# Patient Record
Sex: MALE | Race: WHITE | NOT HISPANIC OR LATINO | Employment: UNEMPLOYED | URBAN - METROPOLITAN AREA
[De-identification: names, ages, dates, MRNs, and addresses within clinical notes are randomized per-mention and may not be internally consistent; named-entity substitution may affect disease eponyms.]

---

## 2017-03-02 ENCOUNTER — OFFICE VISIT (OUTPATIENT)
Dept: FAMILY MEDICINE CLINIC | Facility: CLINIC | Age: 32
End: 2017-03-02

## 2017-03-02 VITALS
OXYGEN SATURATION: 98 % | BODY MASS INDEX: 28.58 KG/M2 | HEART RATE: 92 BPM | HEIGHT: 69 IN | SYSTOLIC BLOOD PRESSURE: 122 MMHG | TEMPERATURE: 98 F | WEIGHT: 193 LBS | DIASTOLIC BLOOD PRESSURE: 62 MMHG

## 2017-03-02 DIAGNOSIS — Z71.6 ENCOUNTER FOR SMOKING CESSATION COUNSELING: Primary | ICD-10-CM

## 2017-03-02 PROCEDURE — 99212 OFFICE O/P EST SF 10 MIN: CPT | Performed by: NURSE PRACTITIONER

## 2017-03-02 PROCEDURE — 99406 BEHAV CHNG SMOKING 3-10 MIN: CPT | Performed by: NURSE PRACTITIONER

## 2017-03-02 RX ORDER — VARENICLINE TARTRATE 0.5 MG/1
TABLET, FILM COATED ORAL
Qty: 60 TABLET | Refills: 0 | Status: SHIPPED | OUTPATIENT
Start: 2017-03-02 | End: 2019-03-19

## 2017-03-02 NOTE — PROGRESS NOTES
"Rosie Freeman is a 31 y.o. male. Patient would like to quit smoking     History of Present Illness   He and wife plan to quit smoking together. Has tried cold turkey, chewing on bubble gum and sunflower seeds. Distraction. Was at 1ppd, now 1/2 ppd, just unable to remain full remission from cigarettes.   The following portions of the patient's history were reviewed and updated as appropriate: allergies, current medications, past family history, past medical history, past social history, past surgical history and problem list.    Review of Systems   Constitutional: Positive for activity change and fatigue. Negative for fever and unexpected weight change.   HENT: Negative.    Respiratory: Negative.    Cardiovascular: Negative.    Gastrointestinal: Negative.  Negative for abdominal pain.   Endocrine: Negative for cold intolerance, heat intolerance, polydipsia, polyphagia and polyuria.   Skin: Positive for wound.   Neurological: Negative for seizures, light-headedness and headaches.   Psychiatric/Behavioral: Positive for dysphoric mood. Negative for agitation, confusion, decreased concentration, hallucinations, self-injury, sleep disturbance and suicidal ideas. The patient is not nervous/anxious and is not hyperactive.      Visit Vitals   • /62   • Pulse 92   • Temp 98 °F (36.7 °C) (Oral)   • Ht 69\" (175.3 cm)   • Wt 193 lb (87.5 kg)   • SpO2 98%   • BMI 28.5 kg/m2     Objective   Physical Exam   Constitutional: He appears well-developed and well-nourished.   Cardiovascular: Normal rate.    Pulmonary/Chest: Effort normal.   Skin: Skin is warm and dry.   Ingrown hair symphysis pubis 4mm across, no redness or heat.    Psychiatric: His behavior is normal. Judgment and thought content normal.   Nursing note and vitals reviewed.    Assessment/Plan   Problems Addressed this Visit     None      Visit Diagnoses     Encounter for smoking cessation counseling    -  Primary        Smoking cessation 5 /7 " minutes face to face counseling, would like to consider chantix for smoking cessation. Continue distractions. Cautions with other SSRIs discussed. Will call if side effects.

## 2017-03-27 ENCOUNTER — TELEPHONE (OUTPATIENT)
Dept: FAMILY MEDICINE CLINIC | Facility: CLINIC | Age: 32
End: 2017-03-27

## 2017-03-27 RX ORDER — VARENICLINE TARTRATE 1 MG/1
1 TABLET, FILM COATED ORAL 2 TIMES DAILY
Qty: 60 TABLET | Refills: 0 | Status: SHIPPED | OUTPATIENT
Start: 2017-03-27 | End: 2019-03-19

## 2017-11-27 ENCOUNTER — OFFICE VISIT (OUTPATIENT)
Dept: FAMILY MEDICINE CLINIC | Facility: CLINIC | Age: 32
End: 2017-11-27

## 2017-11-27 VITALS
WEIGHT: 191 LBS | DIASTOLIC BLOOD PRESSURE: 64 MMHG | OXYGEN SATURATION: 98 % | HEIGHT: 69 IN | BODY MASS INDEX: 28.29 KG/M2 | SYSTOLIC BLOOD PRESSURE: 126 MMHG | TEMPERATURE: 98.4 F | HEART RATE: 96 BPM

## 2017-11-27 DIAGNOSIS — H66.001 ACUTE SUPPURATIVE OTITIS MEDIA OF RIGHT EAR WITHOUT SPONTANEOUS RUPTURE OF TYMPANIC MEMBRANE, RECURRENCE NOT SPECIFIED: Primary | ICD-10-CM

## 2017-11-27 DIAGNOSIS — Z23 NEED FOR INFLUENZA VACCINATION: ICD-10-CM

## 2017-11-27 PROCEDURE — 90471 IMMUNIZATION ADMIN: CPT | Performed by: NURSE PRACTITIONER

## 2017-11-27 PROCEDURE — 99213 OFFICE O/P EST LOW 20 MIN: CPT | Performed by: NURSE PRACTITIONER

## 2017-11-27 PROCEDURE — 90686 IIV4 VACC NO PRSV 0.5 ML IM: CPT | Performed by: NURSE PRACTITIONER

## 2017-11-27 RX ORDER — AMOXICILLIN 875 MG/1
875 TABLET, COATED ORAL 2 TIMES DAILY
Qty: 20 TABLET | Refills: 0 | Status: SHIPPED | OUTPATIENT
Start: 2017-11-27 | End: 2019-03-19

## 2017-11-27 NOTE — PROGRESS NOTES
"Subjective   Gelacio Freeman is a 32 y.o. male. R earache and fullness. Headaches. Started yesterday afternoon with pain and pressure in the right ear and some in the left ear but right is much worse. He did use some numbing gtts they had at home but they did not help any.  Put icy hot on right side of face last night to help with headache and ear ache. Also took Tylenol. Denies fever, chills or ear drainage. Unable to sleep due to pain.     Earache    There is pain in the right ear. This is a new problem. The current episode started yesterday. The problem occurs hourly. The problem has been unchanged. There has been no fever. The pain is at a severity of 3/10. The pain is mild. Associated symptoms include hearing loss (decreased hearing- sounds \"muffled\"). Pertinent negatives include no ear discharge or rhinorrhea. He has tried acetaminophen and heat packs for the symptoms. The treatment provided mild relief.        The following portions of the patient's history were reviewed and updated as appropriate: allergies, current medications, past medical history, past social history, past surgical history and problem list.    Review of Systems   Constitutional: Negative.    HENT: Positive for ear pain and hearing loss (decreased hearing- sounds \"muffled\"). Negative for ear discharge, postnasal drip and rhinorrhea.    Respiratory: Negative.    Cardiovascular: Negative.    Gastrointestinal:        Hx of acid reflux         Objective   Physical Exam   Constitutional: Vital signs are normal. He appears well-developed and well-nourished. He is cooperative.   HENT:   Right Ear: There is swelling and tenderness. No drainage. There is mastoid tenderness. Tympanic membrane is injected, erythematous and bulging. A middle ear effusion is present. Decreased hearing is noted.   Left Ear: Tympanic membrane is erythematous. A middle ear effusion is present.   Cardiovascular: Normal rate and regular rhythm.    Pulmonary/Chest: " "Effort normal and breath sounds normal.   Neurological: He is alert.   Skin: Skin is warm and dry.   Psychiatric: He has a normal mood and affect. His behavior is normal. Judgment and thought content normal.   Nursing note and vitals reviewed.     Vitals:    11/27/17 0859   BP: 126/64   Pulse: 96   Temp: 98.4 °F (36.9 °C)   TempSrc: Oral   SpO2: 98%   Weight: 191 lb (86.6 kg)   Height: 69\" (175.3 cm)       Assessment/Plan   Diagnoses and all orders for this visit:    Acute suppurative otitis media of right ear without spontaneous rupture of tympanic membrane, recurrence not specified  -     amoxicillin (AMOXIL) 875 MG tablet; Take 1 tablet by mouth 2 (Two) Times a Day.    Need for influenza vaccination  -     Flu Vaccine Quad PF 3YR+    RTC if not improved.             "

## 2019-03-19 ENCOUNTER — OFFICE VISIT (OUTPATIENT)
Dept: FAMILY MEDICINE CLINIC | Facility: CLINIC | Age: 34
End: 2019-03-19

## 2019-03-19 VITALS
WEIGHT: 184 LBS | DIASTOLIC BLOOD PRESSURE: 74 MMHG | HEIGHT: 69 IN | SYSTOLIC BLOOD PRESSURE: 124 MMHG | TEMPERATURE: 98.4 F | OXYGEN SATURATION: 97 % | HEART RATE: 71 BPM | BODY MASS INDEX: 27.25 KG/M2

## 2019-03-19 DIAGNOSIS — R09.1 PLEURISY: Primary | ICD-10-CM

## 2019-03-19 PROCEDURE — 99213 OFFICE O/P EST LOW 20 MIN: CPT | Performed by: NURSE PRACTITIONER

## 2019-03-19 PROCEDURE — 71046 X-RAY EXAM CHEST 2 VIEWS: CPT | Performed by: NURSE PRACTITIONER

## 2019-03-19 RX ORDER — METHYLPREDNISOLONE 4 MG/1
TABLET ORAL
Qty: 21 TABLET | Refills: 0 | Status: SHIPPED | OUTPATIENT
Start: 2019-03-19 | End: 2021-01-10

## 2019-03-19 NOTE — PROGRESS NOTES
"Subjective   Gelacio Freeman is a 33 y.o. male who presents c/o pain on right side of chest. Feels like pleurisy.     History of Present Illness   Onset of R sided chest pain last night, worsened this morning though has had a similar episode in the past. Has not had associated cold. 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 Bit by tick last weekend. On R shoulder blade, no rash. Got tick off immediately (not engorged).   Last time had pleurisy went to  ER.   Fell Sat playing basketball, landed on L hip and L arm  The following portions of the patient's history were reviewed and updated as appropriate: allergies, current medications, past family history, past medical history, past social history, past surgical history and problem list.    Review of Systems   Constitutional: Negative for chills and fever.   HENT: Negative.    Respiratory: Positive for chest tightness (R sided) and shortness of breath (did use inhaler from last episode of pleurisy). Negative for cough and wheezing.    Cardiovascular: Negative.    Gastrointestinal: Negative.    Musculoskeletal: Positive for arthralgias (R shoulder blade). Negative for myalgias.   Skin: Negative for rash.     /74   Pulse 71   Temp 98.4 °F (36.9 °C) (Oral)   Ht 175.3 cm (69\")   Wt 83.5 kg (184 lb)   SpO2 97%   BMI 27.17 kg/m²     Objective   Physical Exam   Constitutional: He appears well-developed and well-nourished. No distress.   HENT:   Right Ear: Tympanic membrane normal.   Left Ear: Tympanic membrane normal.   Nose: Nose normal. Right sinus exhibits no maxillary sinus tenderness and no frontal sinus tenderness. Left sinus exhibits no maxillary sinus tenderness and no frontal sinus tenderness.   Mouth/Throat: Uvula is midline and mucous membranes are normal. Posterior oropharyngeal erythema present.   Neck: Normal range of motion. Neck supple. No JVD present. No tracheal deviation present. No thyromegaly present. "   Cardiovascular: Normal rate, regular rhythm and normal heart sounds.   Pulmonary/Chest: Effort normal. He has wheezes (trivial R).   Musculoskeletal:        Right shoulder: He exhibits decreased range of motion, bony tenderness (R scapula, clavicle and ribs nontender) and spasm (R rhomboids). He exhibits no crepitus and no pain.        Cervical back: Normal.        Thoracic back: He exhibits spasm (R rhomboids). He exhibits no bony tenderness.   Lymphadenopathy:     He has no cervical adenopathy.   Nursing note and vitals reviewed.    Assessment/Plan   Problems Addressed this Visit     None      Visit Diagnoses     Pleurisy    -  Primary    Relevant Medications    methylPREDNISolone (MEDROL) 4 MG tablet    Other Relevant Orders    XR Chest PA & Lateral (In Office)        XRAY with NAD, no comparison, will send to radiology for interpretation--recommend medrol dose pack, follow up if symptoms not settling.

## 2019-10-22 ENCOUNTER — FLU SHOT (OUTPATIENT)
Dept: FAMILY MEDICINE CLINIC | Facility: CLINIC | Age: 34
End: 2019-10-22

## 2019-10-22 DIAGNOSIS — Z23 FLU VACCINE NEED: ICD-10-CM

## 2019-10-22 PROCEDURE — 90471 IMMUNIZATION ADMIN: CPT | Performed by: FAMILY MEDICINE

## 2019-10-22 PROCEDURE — 90674 CCIIV4 VAC NO PRSV 0.5 ML IM: CPT | Performed by: FAMILY MEDICINE

## 2020-02-10 ENCOUNTER — TELEPHONE (OUTPATIENT)
Dept: FAMILY MEDICINE CLINIC | Facility: CLINIC | Age: 35
End: 2020-02-10

## 2020-02-10 RX ORDER — OSELTAMIVIR PHOSPHATE 75 MG/1
75 CAPSULE ORAL 2 TIMES DAILY
Qty: 10 CAPSULE | Refills: 0 | Status: SHIPPED | OUTPATIENT
Start: 2020-02-10 | End: 2021-01-10

## 2020-02-10 NOTE — TELEPHONE ENCOUNTER
Pt has flu like symptoms and his daughter has the flu. Pt is asking that you send in tamiflu to [facility] on [address].

## 2020-03-31 ENCOUNTER — TELEPHONE (OUTPATIENT)
Dept: FAMILY MEDICINE CLINIC | Facility: CLINIC | Age: 35
End: 2020-03-31

## 2020-03-31 NOTE — TELEPHONE ENCOUNTER
Patients wife called saying that his mother was exposed to a positive covid 19 patient while delivering groceries. His mother lives with her and has been asked to self quarantine. She has no symptoms and the exposure was 13 days ago. Positive test came back today. Patients employer advised him to get tested for covid 19 and spouse called to inquire. He does not have any symptoms, is not a health care worker and was not directly exposed. Spouse informed he would not be tested under the current guidelines because of this but should self quarantine also. YUDY.

## 2021-01-07 ENCOUNTER — OFFICE VISIT (OUTPATIENT)
Dept: FAMILY MEDICINE CLINIC | Facility: CLINIC | Age: 36
End: 2021-01-07

## 2021-01-07 VITALS
WEIGHT: 182 LBS | BODY MASS INDEX: 26.05 KG/M2 | HEIGHT: 70 IN | OXYGEN SATURATION: 98 % | SYSTOLIC BLOOD PRESSURE: 122 MMHG | DIASTOLIC BLOOD PRESSURE: 70 MMHG | TEMPERATURE: 98.2 F | HEART RATE: 68 BPM

## 2021-01-07 DIAGNOSIS — L30.9 PERIORBITAL DERMATITIS: Primary | ICD-10-CM

## 2021-01-07 PROCEDURE — 99213 OFFICE O/P EST LOW 20 MIN: CPT | Performed by: NURSE PRACTITIONER

## 2021-01-07 RX ORDER — MELOXICAM 15 MG/1
15 TABLET ORAL DAILY PRN
COMMUNITY
Start: 2020-12-07 | End: 2022-03-08

## 2021-01-07 RX ORDER — TRIAMCINOLONE ACETONIDE 1 MG/G
CREAM TOPICAL 2 TIMES DAILY
Qty: 15 G | Refills: 0 | Status: SHIPPED | OUTPATIENT
Start: 2021-01-07 | End: 2022-03-08

## 2021-01-07 NOTE — PROGRESS NOTES
"Subjective   Gelacio Freeman is a 35 y.o. male who presents c/o swelling under right eye off and on x  1 month.    History of Present Illness   Itching an mild swelling. No pain. No discharge. No nodularity. No visual changes. Has been drier and more swollen at times.  The following portions of the patient's history were reviewed and updated as appropriate: allergies, current medications, past family history, past medical history, past social history, past surgical history and problem list.    Review of Systems   Constitutional: Positive for activity change and fatigue. Negative for fever and unexpected weight change.   HENT: Negative.    Eyes: Positive for redness and itching. Negative for photophobia, pain, discharge and visual disturbance.   Respiratory: Negative.    Cardiovascular: Negative.    Gastrointestinal: Negative.  Negative for abdominal pain.   Endocrine: Negative for cold intolerance, heat intolerance, polydipsia, polyphagia and polyuria.   Skin: Positive for color change and rash.   Neurological: Negative for seizures, light-headedness and headaches.   Psychiatric/Behavioral: Positive for decreased concentration, dysphoric mood and sleep disturbance. Negative for agitation, confusion, hallucinations, self-injury and suicidal ideas. The patient is nervous/anxious. The patient is not hyperactive.    /70   Pulse 68   Temp 98.2 °F (36.8 °C) (Infrared)   Ht 177.8 cm (70\")   Wt 82.6 kg (182 lb)   SpO2 98%   BMI 26.11 kg/m²       Objective   Physical Exam  Constitutional:       General: He is not in acute distress.     Appearance: He is well-developed. He is not diaphoretic.   Eyes:      Comments: R lower lid swollen scaly and red. Mild cracking. No bleeding or nodularity   Pulmonary:      Effort: Pulmonary effort is normal.   Skin:     General: Skin is dry.   Neurological:      Mental Status: He is alert and oriented to person, place, and time.   Psychiatric:         Behavior: Behavior " normal.         Thought Content: Thought content normal.         Judgment: Judgment normal.       Assessment/Plan   Problems Addressed this Visit     None      Visit Diagnoses     Periorbital dermatitis    -  Primary    Relevant Medications    triamcinolone (KENALOG) 0.1 % cream      Diagnoses       Codes Comments    Periorbital dermatitis    -  Primary ICD-10-CM: L30.9  ICD-9-CM: 692.9         Cautions with steroid use around the eye given. Should not treat for more than 2 weeks, do not get steroid into eye. FU if not settling

## 2022-03-08 ENCOUNTER — OFFICE VISIT (OUTPATIENT)
Dept: FAMILY MEDICINE CLINIC | Facility: CLINIC | Age: 37
End: 2022-03-08

## 2022-03-08 VITALS
HEART RATE: 69 BPM | WEIGHT: 184 LBS | HEIGHT: 70 IN | OXYGEN SATURATION: 98 % | BODY MASS INDEX: 26.34 KG/M2 | SYSTOLIC BLOOD PRESSURE: 116 MMHG | DIASTOLIC BLOOD PRESSURE: 62 MMHG

## 2022-03-08 DIAGNOSIS — L50.9 URTICARIA: Primary | ICD-10-CM

## 2022-03-08 PROCEDURE — 99213 OFFICE O/P EST LOW 20 MIN: CPT

## 2022-03-08 RX ORDER — METHYLPREDNISOLONE 4 MG/1
1 TABLET ORAL DAILY
Qty: 21 EACH | Refills: 0 | Status: SHIPPED | OUTPATIENT
Start: 2022-03-08 | End: 2022-03-08

## 2022-03-08 RX ORDER — IBUPROFEN 800 MG/1
800 TABLET ORAL EVERY 6 HOURS PRN
COMMUNITY
Start: 2022-01-28

## 2022-03-08 RX ORDER — PREDNISONE 20 MG/1
TABLET ORAL
Qty: 13 TABLET | Refills: 0 | Status: SHIPPED | OUTPATIENT
Start: 2022-03-08 | End: 2022-03-16

## 2022-03-08 NOTE — PROGRESS NOTES
"Rosie Freeman is a 36 y.o. male. Who presents with complaints of a rash on neck       History of Present Illness     Rash on neck    Was lying on couch and Felt like got bit on Thursday night and Friday woke up with a rash on L side of neck that spread to R side of neck and now down to chest .   Had allergy test done prior due to same issue- states found nothing  Has gotten similar rash before head to toe, last time 5 months ago.   Has not found any triggers- thinks happens mostly in cold weather.   Only symptom itchy  Has been using Benadryl spray and cream and taking PO benadryl with some relief.     The following portions of the patient's history were reviewed and updated as appropriate: allergies, current medications, past family history, past medical history, past social history and past surgical history.    Review of Systems   Constitutional: Negative for fatigue, fever and unexpected weight loss.   Musculoskeletal: Negative for arthralgias.   Skin: Positive for rash. Negative for skin lesions.   Psychiatric/Behavioral: Negative for sleep disturbance.       Objective    /62   Pulse 69   Ht 177.8 cm (70\")   Wt 83.5 kg (184 lb)   SpO2 98%   BMI 26.40 kg/m²     Physical Exam  Constitutional:       Appearance: Normal appearance.   Pulmonary:      Effort: Pulmonary effort is normal. No respiratory distress.   Skin:     Findings: Rash present. Rash is urticarial.          Neurological:      General: No focal deficit present.      Mental Status: He is alert and oriented to person, place, and time.   Psychiatric:         Mood and Affect: Mood normal.         Behavior: Behavior normal.         Thought Content: Thought content normal.         Judgment: Judgment normal.         Assessment/Plan   Diagnoses and all orders for this visit:    1. Urticaria (Primary)  -     predniSONE (DELTASONE) 20 MG tablet; Take 3 tablets by mouth Daily for 2 days, THEN 2 tablets Daily for 2 days, THEN 1 " tablet Daily for 2 days, THEN 0.5 tablets Daily for 2 days.  Dispense: 13 tablet; Refill: 0  -    Discussed rash and description of symptom sounds more urticarial. Discussed Dx, symptoms and Tx options. First line tx to prevent reoccurrence is daily antihistamines such as fexofenadine, citrizine, loratadine, etc. Advised against using benadryl as sedating. May use cold compress and topical benadryl for symptoms. Pay attention for triggers.   -   Will give a steroid taper as rash is spread and topical tx may not be feasible. Use and SE discussed.     RTC if no improvement or worsening of symptoms.      - Pt agrees with plan of care and states no further concerns or questions today    This document is intended for medical expert use only. Reading of this document by patients and/or patient's family without participating medical staff guidance may result in misinterpretation and unintended morbidity.  Any interpretation of such data is the responsibility of the patient and/or family member responsible for the patient in concert with their primary or specialist providers, not to be left for sources of online searches such as [employer], [employer] or similar queries. Relying on these approaches to knowledge may result in misinterpretation, misguided goals of care and even death should patients or family members try recommendations outside of the realm of professional medical care in a supervised way.     Please allow 3-5 business days for recommendations based on new results     Go to the ER for any possible lifethreatening symptoms such as chest pain or shortness of air.      I personally spent 20 minutes with this patient, preparing for the visit, reviewing tests, obtaining and/or reviewing a separately obtained history, performing a medically appropriate examination and/or evaluation , counseling and educating the patient/family/caregiver, ordering medications, tests, or procedures, documenting information in the medical  record and independently interpreting results.

## 2024-07-29 ENCOUNTER — OFFICE VISIT (OUTPATIENT)
Dept: FAMILY MEDICINE CLINIC | Facility: CLINIC | Age: 39
End: 2024-07-29
Payer: COMMERCIAL

## 2024-07-29 VITALS
HEIGHT: 70 IN | OXYGEN SATURATION: 98 % | HEART RATE: 66 BPM | BODY MASS INDEX: 27.37 KG/M2 | SYSTOLIC BLOOD PRESSURE: 108 MMHG | DIASTOLIC BLOOD PRESSURE: 64 MMHG | WEIGHT: 191.2 LBS

## 2024-07-29 DIAGNOSIS — F51.01 PRIMARY INSOMNIA: ICD-10-CM

## 2024-07-29 DIAGNOSIS — F32.2 CURRENT SEVERE EPISODE OF MAJOR DEPRESSIVE DISORDER WITHOUT PSYCHOTIC FEATURES, UNSPECIFIED WHETHER RECURRENT: Primary | ICD-10-CM

## 2024-07-29 DIAGNOSIS — Z83.3 FAMILY HISTORY OF DIABETES MELLITUS: ICD-10-CM

## 2024-07-29 DIAGNOSIS — Z11.59 ENCOUNTER FOR HEPATITIS C SCREENING TEST FOR LOW RISK PATIENT: ICD-10-CM

## 2024-07-29 DIAGNOSIS — E78.2 MIXED HYPERLIPIDEMIA: ICD-10-CM

## 2024-07-29 PROCEDURE — 99396 PREV VISIT EST AGE 40-64: CPT | Performed by: NURSE PRACTITIONER

## 2024-07-29 RX ORDER — BUPROPION HYDROCHLORIDE 150 MG/1
150 TABLET ORAL DAILY
Qty: 90 TABLET | Refills: 3 | Status: SHIPPED | OUTPATIENT
Start: 2024-07-29

## 2024-07-29 RX ORDER — TRAZODONE HYDROCHLORIDE 100 MG/1
100 TABLET ORAL NIGHTLY
Qty: 90 TABLET | Refills: 3 | Status: SHIPPED | OUTPATIENT
Start: 2024-07-29

## 2024-07-29 NOTE — PROGRESS NOTES
Subjective   Gelacio Freeman is a 39 y.o. male. Presents today for   Chief Complaint   Patient presents with    Insomnia    Depression         HPI Annual Physical, establish care, Depression and Insomnia    Has tried  ambien in the past for insomnia and awoke in his boxers on the porch in the dead of winter.  Uses marijuana to sleep.  He is depressed so much so that he does not even want to go fishing or hunting - and he has always been an outdoorsman.  This has been ongoing for a couple of years.  He would like medication to help him.            Past Medical History:   Diagnosis Date    Achilles bursitis     Anxiety     Asthma     Chronic pain syndrome     Colitis     Depression     Disc degeneration, lumbosacral     Familial hypertriglyceridemia     Fatigue     Gastroparesis     GERD (gastroesophageal reflux disease)     Leukocytosis     Low back pain     Lumbar postlaminectomy syndrome     Lumbar radiculopathy     Medication addiction, episodic     Opiate withdrawal     Renal shutdown, acute     Seizure     Snoring     Vitamin D deficiency        Past Surgical History:   Procedure Laterality Date    BACK SURGERY      KNEE ARTHROSCOPY W/ MENISCAL REPAIR Bilateral         No Known Allergies    Current Outpatient Medications on File Prior to Visit   Medication Sig Dispense Refill    ibuprofen (ADVIL,MOTRIN) 800 MG tablet Take 800 mg by mouth Every 6 (Six) Hours As Needed. for pain (Patient not taking: Reported on 7/29/2024)       No current facility-administered medications on file prior to visit.       Social History     Socioeconomic History    Marital status:    Tobacco Use    Smoking status: Every Day     Current packs/day: 0.50     Average packs/day: 0.5 packs/day for 24.6 years (12.3 ttl pk-yrs)     Types: Cigarettes     Start date: 2000    Smokeless tobacco: Never   Vaping Use    Vaping status: Every Day    Substances: Nicotine, THC    Devices: Disposable   Substance and Sexual Activity     "Alcohol use: Not Currently    Drug use: No    Sexual activity: Defer       Occupation/Lives with: [employer]. Lives with wife and children    Sleep: Gets 4-8 hours of sleep/night    Exercise: softball and walking at work    Nutrition: not so good at present    Caffeine: Monster in am; Dr. Pepper, Sweet Tea    Tobb/Vaping/Illicit Drugs/ETOH: marijuana    Sexual hx (#partners, m/f, bc, LMP): one, wife    Mood: severely depressed    Safety: Feels safe at home with the people he/she is around.    Health Maintenance/Labs: will be done today    Last eye exam: March 2024    Last dentist visit: he does not remember - 6 month to a year    Specialists: Neurology - shocked with 220 V, Gastro, Ortho, Pleurisy twice (pulmonary)  ___________________________  Review of Systems   Denies dizziness, fatigue, fever/chills, CP, SOA, headache, blood in urine/stool, abd pain, leg swelling.    Objective   Vitals:    07/29/24 1527   BP: 108/64   Pulse: 66   SpO2: 98%   Weight: 86.7 kg (191 lb 3.2 oz)   Height: 177.8 cm (70\")     Body mass index is 27.43 kg/m².    Physical Exam  Constitutional:       Appearance: He is obese.   HENT:      Head: Normocephalic and atraumatic.      Mouth/Throat:      Mouth: Mucous membranes are moist.   Eyes:      Pupils: Pupils are equal, round, and reactive to light.   Cardiovascular:      Rate and Rhythm: Normal rate and regular rhythm.      Pulses: Normal pulses.      Heart sounds: Normal heart sounds.   Pulmonary:      Effort: Pulmonary effort is normal.      Breath sounds: Normal breath sounds.   Abdominal:      General: Bowel sounds are normal.   Musculoskeletal:         General: Normal range of motion.   Skin:     General: Skin is warm and dry.      Capillary Refill: Capillary refill takes less than 2 seconds.   Neurological:      General: No focal deficit present.      Mental Status: He is alert.      Comments: CN II-XII grossly intact on exam AEB following finger with eyes, puffing cheeks, sticking " out tongue, wiggling tongue, raising eyebrows, and shrugging shoulders.   Patient has equal push pull of upper and lower extremities.  Patient can heel walk, toe walk, heel-toe walk, squat and duck walk without difficulty.  There is no s/s of scoliosis while patient bends over and this provider palpates spine.     Psychiatric:         Mood and Affect: Mood normal.             Procedures     Assessment & Plan   Diagnoses and all orders for this visit:    1. Current severe episode of major depressive disorder without psychotic features, unspecified whether recurrent (Primary)  -     buPROPion XL (Wellbutrin XL) 150 MG 24 hr tablet; Take 1 tablet by mouth Daily.  Dispense: 90 tablet; Refill: 3  -     CBC & Differential  -     Comprehensive Metabolic Panel    2. Primary insomnia  -     traZODone (DESYREL) 100 MG tablet; Take 1 tablet by mouth Every Night.  Dispense: 90 tablet; Refill: 3    3. Mixed hyperlipidemia  -     Lipid Panel    4. Family history of diabetes mellitus  -     Hemoglobin A1c    5. Encounter for hepatitis C screening test for low risk patient  -     HCV Antibody Rfx To Qnt PCR  -     Hepatitis C Virus Interpretation        Body mass index is 27.43 kg/m².       Return in about 6 months (around 1/29/2025) for Next scheduled follow up.     Counseled on a healthy diet. Use condoms 100% of time with sex as IUD does not protect against STIs. Eat a healthy diet and exercise routinely. Avoid smoking/alcohol and drugs. Use seatbelt 100% of time.     Discussed Care Gaps, ordered referrals and encouraged vaccination updates.       - Pt agrees with plan of care and denies further questions/concerns today  - This document is intended for medical expert use only. Persons  reading this document without medical staff guidance may result in misinterpretation and unintended morbidity     Go to the ER for any possible life-threatening symptoms such as chest pain or shortness of air.      Please allow 3-5 business days  for recommendations based on new results      I personally spent time with this patient, preparing for the visit, reviewing tests, obtaining and/or reviewing a separately obtained history, performing a medically appropriate examination and/or evaluation, counseling and educating the patient/family/caregiver, ordering medications,  documenting information in the medical record and indepentently interpreting results.

## 2024-07-30 LAB
ALBUMIN SERPL-MCNC: 4.7 G/DL (ref 4.1–5.1)
ALP SERPL-CCNC: 124 IU/L (ref 44–121)
ALT SERPL-CCNC: 34 IU/L (ref 0–44)
AST SERPL-CCNC: 30 IU/L (ref 0–40)
BASOPHILS # BLD AUTO: 0.1 X10E3/UL (ref 0–0.2)
BASOPHILS NFR BLD AUTO: 1 %
BILIRUB SERPL-MCNC: 0.2 MG/DL (ref 0–1.2)
BUN SERPL-MCNC: 6 MG/DL (ref 6–24)
BUN/CREAT SERPL: 6 (ref 9–20)
CALCIUM SERPL-MCNC: 9.7 MG/DL (ref 8.7–10.2)
CHLORIDE SERPL-SCNC: 101 MMOL/L (ref 96–106)
CHOLEST SERPL-MCNC: 230 MG/DL (ref 100–199)
CO2 SERPL-SCNC: 24 MMOL/L (ref 20–29)
CREAT SERPL-MCNC: 1.02 MG/DL (ref 0.76–1.27)
EGFRCR SERPLBLD CKD-EPI 2021: 95 ML/MIN/1.73
EOSINOPHIL # BLD AUTO: 0.3 X10E3/UL (ref 0–0.4)
EOSINOPHIL NFR BLD AUTO: 3 %
ERYTHROCYTE [DISTWIDTH] IN BLOOD BY AUTOMATED COUNT: 13 % (ref 11.6–15.4)
GLOBULIN SER CALC-MCNC: 2.5 G/DL (ref 1.5–4.5)
GLUCOSE SERPL-MCNC: 89 MG/DL (ref 70–99)
HBA1C MFR BLD: 5.7 % (ref 4.8–5.6)
HCT VFR BLD AUTO: 45.4 % (ref 37.5–51)
HCV AB SERPL QL IA: NORMAL
HCV IGG SERPL QL IA: NON REACTIVE
HDLC SERPL-MCNC: 26 MG/DL
HGB BLD-MCNC: 15.2 G/DL (ref 13–17.7)
IMM GRANULOCYTES # BLD AUTO: 0 X10E3/UL (ref 0–0.1)
IMM GRANULOCYTES NFR BLD AUTO: 0 %
LDLC SERPL CALC-MCNC: 102 MG/DL (ref 0–99)
LYMPHOCYTES # BLD AUTO: 3.6 X10E3/UL (ref 0.7–3.1)
LYMPHOCYTES NFR BLD AUTO: 34 %
MCH RBC QN AUTO: 30 PG (ref 26.6–33)
MCHC RBC AUTO-ENTMCNC: 33.5 G/DL (ref 31.5–35.7)
MCV RBC AUTO: 90 FL (ref 79–97)
MONOCYTES # BLD AUTO: 0.7 X10E3/UL (ref 0.1–0.9)
MONOCYTES NFR BLD AUTO: 7 %
NEUTROPHILS # BLD AUTO: 5.7 X10E3/UL (ref 1.4–7)
NEUTROPHILS NFR BLD AUTO: 55 %
PLATELET # BLD AUTO: 379 X10E3/UL (ref 150–450)
POTASSIUM SERPL-SCNC: 3.9 MMOL/L (ref 3.5–5.2)
PROT SERPL-MCNC: 7.2 G/DL (ref 6–8.5)
RBC # BLD AUTO: 5.07 X10E6/UL (ref 4.14–5.8)
SODIUM SERPL-SCNC: 140 MMOL/L (ref 134–144)
TRIGL SERPL-MCNC: 598 MG/DL (ref 0–149)
VLDLC SERPL CALC-MCNC: 102 MG/DL (ref 5–40)
WBC # BLD AUTO: 10.3 X10E3/UL (ref 3.4–10.8)

## 2024-08-29 NOTE — PROGRESS NOTES
"Subjective   Gelacio Freeman is a 39 y.o. male. Presents today for   Chief Complaint   Patient presents with    Depression     Follow Up     Not taking Bupropion due to sleepiness at work       History Of Present Illness Gelacio presents today for 1 month follow-up on depression and insomnia.  He reports that the trazodone is giving him vivid dreams and the Bupropion is making him too sleepy during the day to take them.    Depression: Bupropion     Insomnia: Trazodone    Care gaps:  BMI  Pneumococcal vaccine  Tdap  COVID-not available  Influenza-not available      There is no problem list on file for this patient.      Social History     Socioeconomic History    Marital status:    Tobacco Use    Smoking status: Every Day     Current packs/day: 0.50     Average packs/day: 0.5 packs/day for 24.7 years (12.3 ttl pk-yrs)     Types: Cigarettes     Start date: 2000    Smokeless tobacco: Never   Vaping Use    Vaping status: Every Day    Substances: Nicotine, THC    Devices: Disposable   Substance and Sexual Activity    Alcohol use: Not Currently    Drug use: No    Sexual activity: Defer       No Known Allergies    Current Outpatient Medications on File Prior to Visit   Medication Sig Dispense Refill    [DISCONTINUED] traZODone (DESYREL) 100 MG tablet Take 1 tablet by mouth Every Night. 90 tablet 3    ibuprofen (ADVIL,MOTRIN) 800 MG tablet Take 800 mg by mouth Every 6 (Six) Hours As Needed. for pain (Patient not taking: Reported on 7/29/2024)      [DISCONTINUED] buPROPion XL (Wellbutrin XL) 150 MG 24 hr tablet Take 1 tablet by mouth Daily. (Patient not taking: Reported on 8/30/2024) 90 tablet 3     No current facility-administered medications on file prior to visit.       Objective   Vitals:    08/30/24 1443   BP: 108/64   Pulse: 77   SpO2: 98%   Weight: 79.7 kg (175 lb 12.8 oz)   Height: 177.8 cm (70\")     Body mass index is 25.22 kg/m².    Physical Exam  HENT:      Head: Normocephalic and atraumatic.      " Nose: Nose normal.      Mouth/Throat:      Mouth: Mucous membranes are moist.   Eyes:      Pupils: Pupils are equal, round, and reactive to light.      Comments: Right eye has a broken blood vessel located laterally in the eye.  He does not remember doing anything to it.   Cardiovascular:      Rate and Rhythm: Normal rate and regular rhythm.   Pulmonary:      Effort: Pulmonary effort is normal.      Breath sounds: Normal breath sounds.   Abdominal:      General: Abdomen is flat. Bowel sounds are normal.      Palpations: Abdomen is soft.   Musculoskeletal:         General: Normal range of motion.      Cervical back: Normal range of motion.   Skin:     General: Skin is warm.      Capillary Refill: Capillary refill takes less than 2 seconds.   Neurological:      General: No focal deficit present.      Mental Status: He is alert.   Psychiatric:         Mood and Affect: Mood normal.     Procedures     Assessment & Plan   Diagnoses and all orders for this visit:    1. Primary insomnia (Primary)  -     sertraline (Zoloft) 25 MG tablet; Take 1 tablet by mouth Daily.  Dispense: 30 tablet; Refill: 3    2. Other depression  -     Desvenlafaxine Succinate ER 25 MG tablet sustained-release 24 hour; Take 1 tablet by mouth Daily.  Dispense: 30 tablet; Refill: 3    3. Hypercholesterolemia  -     Lipid Panel    4. Thyroid disorder screening  -     Thyroid Panel With TSH           BMI is >= 25 and <30. (Overweight) The following options were offered after discussion;: weight loss educational material (shared in after visit summary), exercise counseling/recommendations, nutrition counseling/recommendations, and pharmacological intervention options     Discussed Care Gaps, ordered referrals and encouraged vaccination updates.       - Pt agrees with plan of care and denies further questions/concerns today  - This document is intended for medical expert use only. Persons  reading this document without medical staff guidance may result in  misinterpretation and unintended morbidity     Go to the ER for any possible life-threatening symptoms such as chest pain or shortness of air.      Please allow 3-5 business days for recommendations based on new results      I personally spent time with this patient, preparing for the visit, reviewing tests, obtaining and/or reviewing a separately obtained history, performing a medically appropriate examination and/or evaluation, counseling and educating the patient/family/caregiver, ordering medications,  documenting information in the medical record and indepentently interpreting results.         No follow-ups on file.

## 2024-08-30 ENCOUNTER — OFFICE VISIT (OUTPATIENT)
Dept: FAMILY MEDICINE CLINIC | Facility: CLINIC | Age: 39
End: 2024-08-30
Payer: COMMERCIAL

## 2024-08-30 VITALS
WEIGHT: 175.8 LBS | BODY MASS INDEX: 25.17 KG/M2 | DIASTOLIC BLOOD PRESSURE: 64 MMHG | SYSTOLIC BLOOD PRESSURE: 108 MMHG | HEIGHT: 70 IN | OXYGEN SATURATION: 98 % | HEART RATE: 77 BPM

## 2024-08-30 DIAGNOSIS — E78.00 HYPERCHOLESTEROLEMIA: ICD-10-CM

## 2024-08-30 DIAGNOSIS — F32.89 OTHER DEPRESSION: ICD-10-CM

## 2024-08-30 DIAGNOSIS — Z13.29 THYROID DISORDER SCREENING: ICD-10-CM

## 2024-08-30 DIAGNOSIS — F51.01 PRIMARY INSOMNIA: Primary | ICD-10-CM

## 2024-08-30 PROCEDURE — 99213 OFFICE O/P EST LOW 20 MIN: CPT | Performed by: NURSE PRACTITIONER

## 2024-08-30 RX ORDER — DESVENLAFAXINE 25 MG/1
25 TABLET, EXTENDED RELEASE ORAL DAILY
Qty: 30 TABLET | Refills: 3 | Status: SHIPPED | OUTPATIENT
Start: 2024-08-30

## 2024-08-30 RX ORDER — SERTRALINE HYDROCHLORIDE 25 MG/1
25 TABLET, FILM COATED ORAL DAILY
Qty: 30 TABLET | Refills: 3 | Status: SHIPPED | OUTPATIENT
Start: 2024-08-30

## 2024-08-31 LAB
CHOLEST SERPL-MCNC: 156 MG/DL (ref 100–199)
FT4I SERPL CALC-MCNC: 2.2 (ref 1.2–4.9)
HDLC SERPL-MCNC: 32 MG/DL
LDLC SERPL CALC-MCNC: 107 MG/DL (ref 0–99)
T3RU NFR SERPL: 26 % (ref 24–39)
T4 SERPL-MCNC: 8.5 UG/DL (ref 4.5–12)
TRIGL SERPL-MCNC: 87 MG/DL (ref 0–149)
TSH SERPL DL<=0.005 MIU/L-ACNC: 1.49 UIU/ML (ref 0.45–4.5)
VLDLC SERPL CALC-MCNC: 17 MG/DL (ref 5–40)

## 2024-10-14 ENCOUNTER — OFFICE VISIT (OUTPATIENT)
Dept: FAMILY MEDICINE CLINIC | Facility: CLINIC | Age: 39
End: 2024-10-14
Payer: COMMERCIAL

## 2024-10-14 VITALS
OXYGEN SATURATION: 97 % | WEIGHT: 175.8 LBS | HEART RATE: 64 BPM | HEIGHT: 70 IN | SYSTOLIC BLOOD PRESSURE: 102 MMHG | BODY MASS INDEX: 25.17 KG/M2 | DIASTOLIC BLOOD PRESSURE: 64 MMHG

## 2024-10-14 DIAGNOSIS — F32.89 OTHER DEPRESSION: ICD-10-CM

## 2024-10-14 PROCEDURE — 99213 OFFICE O/P EST LOW 20 MIN: CPT | Performed by: NURSE PRACTITIONER

## 2024-10-14 RX ORDER — DESVENLAFAXINE 25 MG/1
50 TABLET, EXTENDED RELEASE ORAL DAILY
Qty: 30 TABLET | Refills: 3 | Status: SHIPPED | OUTPATIENT
Start: 2024-10-14 | End: 2024-10-15

## 2024-10-14 RX ORDER — HYDROXYZINE HYDROCHLORIDE 25 MG/1
25 TABLET, FILM COATED ORAL
Qty: 90 TABLET | Refills: 3 | Status: SHIPPED | OUTPATIENT
Start: 2024-10-14

## 2024-10-14 NOTE — PROGRESS NOTES
"Subjective   Gelacio Freeman is a 39 y.o. male. Presents today for   Chief Complaint   Patient presents with    Depression    Insomnia       History Of Present Illness Gelacio presents for 1 month follow-up on depression and insomnia    Depression: Desvenlafaxine    Insomnia: Zoloft    Care gaps:  Pneumonia  Tdap  Influenza  COVID      There is no problem list on file for this patient.      Social History     Socioeconomic History    Marital status:    Tobacco Use    Smoking status: Every Day     Current packs/day: 0.50     Average packs/day: 0.5 packs/day for 24.8 years (12.4 ttl pk-yrs)     Types: Cigarettes     Start date: 2000    Smokeless tobacco: Never   Vaping Use    Vaping status: Every Day    Substances: Nicotine, THC    Devices: Disposable   Substance and Sexual Activity    Alcohol use: Not Currently    Drug use: No    Sexual activity: Defer       No Known Allergies    Current Outpatient Medications on File Prior to Visit   Medication Sig Dispense Refill    sertraline (Zoloft) 25 MG tablet Take 1 tablet by mouth Daily. 30 tablet 3    ibuprofen (ADVIL,MOTRIN) 800 MG tablet Take 800 mg by mouth Every 6 (Six) Hours As Needed. for pain (Patient not taking: Reported on 10/14/2024)       No current facility-administered medications on file prior to visit.       Objective   Vitals:    10/14/24 0806   BP: 102/64   Pulse: 64   SpO2: 97%   Weight: 79.7 kg (175 lb 12.8 oz)   Height: 177.8 cm (70\")     Body mass index is 25.22 kg/m².    Physical Exam  Constitutional:       Appearance: Normal appearance.   HENT:      Head: Normocephalic and atraumatic.      Nose: Nose normal.      Mouth/Throat:      Mouth: Mucous membranes are moist.   Eyes:      Pupils: Pupils are equal, round, and reactive to light.   Cardiovascular:      Rate and Rhythm: Normal rate and regular rhythm.      Pulses: Normal pulses.      Heart sounds: Normal heart sounds.   Pulmonary:      Effort: Pulmonary effort is normal.      Breath " sounds: Normal breath sounds.   Abdominal:      General: Bowel sounds are normal.   Musculoskeletal:         General: Normal range of motion.      Cervical back: Normal range of motion.   Skin:     General: Skin is warm and dry.      Capillary Refill: Capillary refill takes less than 2 seconds.   Neurological:      General: No focal deficit present.      Mental Status: He is alert.   Psychiatric:         Mood and Affect: Mood normal.         Procedures     Assessment & Plan   Diagnoses and all orders for this visit:    1. Other depression  -     Discontinue: Desvenlafaxine Succinate ER 25 MG tablet sustained-release 24 hour; Take 2 tablets by mouth Daily.  Dispense: 30 tablet; Refill: 3    Other orders  -     hydrOXYzine (ATARAX) 25 MG tablet; Take 1 tablet by mouth every night at bedtime.  Dispense: 90 tablet; Refill: 3                      No follow-ups on file.

## 2024-10-15 ENCOUNTER — TELEPHONE (OUTPATIENT)
Dept: FAMILY MEDICINE CLINIC | Facility: CLINIC | Age: 39
End: 2024-10-15
Payer: COMMERCIAL

## 2024-10-15 DIAGNOSIS — F32.89 OTHER DEPRESSION: Primary | ICD-10-CM

## 2024-10-15 RX ORDER — DESVENLAFAXINE 50 MG/1
50 TABLET, FILM COATED, EXTENDED RELEASE ORAL DAILY
Qty: 90 TABLET | Refills: 3 | Status: SHIPPED | OUTPATIENT
Start: 2024-10-15

## 2024-10-15 NOTE — TELEPHONE ENCOUNTER
Caller: JEANIE WITH [facility]     Relationship: [unfilled]     Best call back number:[phone]     What is your medical concern? JEANIE WITH [facility] CALLED STATED THE MEDICATION Desvenlafaxine Succinate ER 25 MG tablet sustained-release 24 hour  TWO TABLETS FOR ONCE A DAY.  WANTS TO KNOW IF POLLY KUMAR CAN SWITCH IT TO ONE TABLET ONCE PER DAY FOR 50 MG, DUE TO THE INSURANCE WILL NOT COVER FOR TWO PILLS A DAY.  PLEASE CALL HER BACK OR SEND IN NEW PRESCRIPTION.

## 2024-11-07 DIAGNOSIS — F32.89 OTHER DEPRESSION: ICD-10-CM

## 2024-11-07 RX ORDER — DESVENLAFAXINE 50 MG/1
50 TABLET, FILM COATED, EXTENDED RELEASE ORAL DAILY
Qty: 90 TABLET | Refills: 3 | Status: SHIPPED | OUTPATIENT
Start: 2024-11-07

## 2025-01-17 ENCOUNTER — OFFICE VISIT (OUTPATIENT)
Dept: FAMILY MEDICINE CLINIC | Facility: CLINIC | Age: 40
End: 2025-01-17
Payer: COMMERCIAL

## 2025-01-17 VITALS
OXYGEN SATURATION: 98 % | DIASTOLIC BLOOD PRESSURE: 62 MMHG | HEART RATE: 65 BPM | BODY MASS INDEX: 27.32 KG/M2 | HEIGHT: 70 IN | WEIGHT: 190.8 LBS | SYSTOLIC BLOOD PRESSURE: 108 MMHG

## 2025-01-17 DIAGNOSIS — R73.03 PREDIABETES: ICD-10-CM

## 2025-01-17 DIAGNOSIS — F51.01 PRIMARY INSOMNIA: Primary | ICD-10-CM

## 2025-01-17 DIAGNOSIS — E78.2 MIXED HYPERLIPIDEMIA: ICD-10-CM

## 2025-01-17 PROCEDURE — 99213 OFFICE O/P EST LOW 20 MIN: CPT | Performed by: NURSE PRACTITIONER

## 2025-01-17 RX ORDER — RAMELTEON 8 MG/1
8 TABLET ORAL NIGHTLY
Qty: 30 TABLET | Refills: 0 | Status: SHIPPED | OUTPATIENT
Start: 2025-01-17

## 2025-01-17 NOTE — PROGRESS NOTES
Subjective   Gelacio Freeman is a 39 y.o. male. Presents today for   Chief Complaint   Patient presents with    Follow-up    Depression       History Of Present Illness Gelacio Freeman is a 39-year-old male presenting today for 3-month follow-up    Depression:  not taking anything - he has removed himself from triggering situations    Insomnia Hydroxyzine does not work; will try Ramelteon    Care gaps:  Pneumonia  Tdap  Influenza  COVID    History of Present Illness  The patient presents for evaluation of insomnia, depression, and hyperlipidemia.    He has discontinued his medication regimen due to perceived ineffectiveness and has not had the opportunity to schedule an appointment until now. His sleep pattern is inconsistent, with some nights characterized by frequent awakenings every 1 to 2 hours, while others are more restful. He typically retires to bed around 11:00 PM or midnight. He reports feeling more alert during the day following a night of insufficient sleep compared to one with approximately 8 hours of sleep. He has attempted to establish a bedtime routine, going to bed at 8:00 PM and watching television for an hour, but often does not fall asleep until 11:00 PM or 12:00 AM. He has previously tried trazodone, which initially improved his sleep but lost its efficacy over time. He has also tried Ambien, which resulted in an episode of sleepwalking. Melatonin was trialed at doses of 5 mg and 10 mg, which provided some relaxation but did not induce sleep. He has not yet tried ramelteon. His sleep is often disrupted by knee pain due to arthritis, although there are days when he can sleep without interruption. He has not yet tried ramelteon.    His depression has shown slight improvement, which he attributes to a change in mindset and avoidance of certain environments. However, he continues to experience occasional bad days, particularly when driving, which may be related to anxiety or  "depression. He experiences severe anger, accompanied by nervousness, anxiety, shaking, and nausea, which has led him to limit his social interactions and focus on work and home life.    He has regained weight after a period of successful weight loss through dietary changes, including the elimination of candy, sugars, and pops, and the consumption of sugar-free products. He also reduced his bread intake and increased his consumption of beef, chicken, and deer soup. He plans to resume these dietary habits to manage his weight.    MEDICATIONS  Discontinued: hydroxyzine, trazodone, Ambien, melatonin    There is no problem list on file for this patient.      Social History     Socioeconomic History    Marital status:    Tobacco Use    Smoking status: Every Day     Current packs/day: 0.50     Average packs/day: 0.5 packs/day for 25.0 years (12.5 ttl pk-yrs)     Types: Cigarettes     Start date: 2000    Smokeless tobacco: Never   Vaping Use    Vaping status: Every Day    Substances: Nicotine, THC    Devices: Disposable   Substance and Sexual Activity    Alcohol use: Not Currently    Drug use: No    Sexual activity: Defer       No Known Allergies    Current Outpatient Medications on File Prior to Visit   Medication Sig Dispense Refill    desvenlafaxine (PRISTIQ) 50 MG 24 hr tablet Take 1 tablet by mouth Daily. (Patient not taking: Reported on 1/17/2025) 90 tablet 3    ibuprofen (ADVIL,MOTRIN) 800 MG tablet Take 800 mg by mouth Every 6 (Six) Hours As Needed. for pain (Patient not taking: Reported on 7/29/2024)      [DISCONTINUED] hydrOXYzine (ATARAX) 25 MG tablet Take 1 tablet by mouth every night at bedtime. (Patient not taking: Reported on 1/17/2025) 90 tablet 3     No current facility-administered medications on file prior to visit.       Objective   Vitals:    01/17/25 1312   BP: 108/62   Pulse: 65   SpO2: 98%   Weight: 86.5 kg (190 lb 12.8 oz)   Height: 177.8 cm (70\")     Body mass index is 27.38 " kg/m².    Physical Exam    Physical Exam  Constitutional:       Appearance: He is obese.   HENT:      Head: Normocephalic and atraumatic.   Cardiovascular:      Rate and Rhythm: Normal rate and regular rhythm.      Pulses: Normal pulses.      Heart sounds: Normal heart sounds.   Pulmonary:      Effort: Pulmonary effort is normal.      Breath sounds: Normal breath sounds.   Neurological:      Mental Status: He is alert.       Results  Laboratory Studies  Cholesterol levels improved, LDL cholesterol slightly high.       Procedures     Assessment & Plan   Diagnoses and all orders for this visit:    1. Primary insomnia (Primary)  -     ramelteon (ROZEREM) 8 MG tablet; Take 1 tablet by mouth Every Night.  Dispense: 30 tablet; Refill: 0    2. Prediabetes  -     Hemoglobin A1c  -     CBC & Differential  -     Basic Metabolic Panel    3. Mixed hyperlipidemia  -     Lipid Panel         Assessment & Plan  1. Insomnia.  He reports ongoing issues with sleep, exacerbated by knee arthritis. Previous medications, including hydroxyzine, trazodone, and melatonin, have been ineffective or caused undesirable side effects. A prescription for ramelteon will be initiated at a low dose to manage his insomnia. He is advised to take it as needed and not necessarily every day. If ramelteon proves ineffective, he is instructed to inform the office promptly.    2. Depression.  He reports a slight improvement in his depressive symptoms but still experiences bad days, particularly related to road rage and anxiety. No new medication is initiated at this time as he feels he is managing better by avoiding certain triggers and environments.    3. Hyperlipidemia.  His cholesterol levels have shown improvement, with only a slight elevation in LDL cholesterol. He is advised to continue dietary modifications, including reducing sugar intake and avoiding sweet teas and candies. Lab work will be conducted to monitor his current status.    Follow-up  The  patient will follow up in 6 months.                 Return in about 3 months (around 4/17/2025).     Discussed Care Gaps, ordered referrals and encouraged vaccination updates.       - Pt agrees with plan of care and denies further questions/concerns today  - This document is intended for medical expert use only. Persons  reading this document without medical staff guidance may result in misinterpretation and unintended morbidity     Go to the ER for any possible life-threatening symptoms such as chest pain or shortness of air.      Please allow 3-5 business days for recommendations based on new results      I personally spent time with this patient, preparing for the visit, reviewing tests, obtaining and/or reviewing a separately obtained history, performing a medically appropriate examination and/or evaluation, counseling and educating the patient/family/caregiver, ordering medications,  documenting information in the medical record and indepentently interpreting results.         Patient or patient representative verbalized consent for the use of Ambient Listening during the visit with  POLLY Vergara for chart documentation. 1/17/2025  15:17 EST

## 2025-01-18 LAB
BASOPHILS # BLD AUTO: 0.1 X10E3/UL (ref 0–0.2)
BASOPHILS NFR BLD AUTO: 1 %
BUN SERPL-MCNC: 11 MG/DL (ref 6–24)
BUN/CREAT SERPL: 11 (ref 9–20)
CALCIUM SERPL-MCNC: 9.6 MG/DL (ref 8.7–10.2)
CHLORIDE SERPL-SCNC: 103 MMOL/L (ref 96–106)
CHOLEST SERPL-MCNC: 191 MG/DL (ref 100–199)
CO2 SERPL-SCNC: 25 MMOL/L (ref 20–29)
CREAT SERPL-MCNC: 1 MG/DL (ref 0.76–1.27)
EGFRCR SERPLBLD CKD-EPI 2021: 98 ML/MIN/1.73
EOSINOPHIL # BLD AUTO: 0.3 X10E3/UL (ref 0–0.4)
EOSINOPHIL NFR BLD AUTO: 3 %
ERYTHROCYTE [DISTWIDTH] IN BLOOD BY AUTOMATED COUNT: 12.7 % (ref 11.6–15.4)
GLUCOSE SERPL-MCNC: 86 MG/DL (ref 70–99)
HBA1C MFR BLD: 5.8 % (ref 4.8–5.6)
HCT VFR BLD AUTO: 44.1 % (ref 37.5–51)
HDLC SERPL-MCNC: 33 MG/DL
HGB BLD-MCNC: 14.9 G/DL (ref 13–17.7)
IMM GRANULOCYTES # BLD AUTO: 0 X10E3/UL (ref 0–0.1)
IMM GRANULOCYTES NFR BLD AUTO: 0 %
LDLC SERPL CALC-MCNC: 133 MG/DL (ref 0–99)
LYMPHOCYTES # BLD AUTO: 3.3 X10E3/UL (ref 0.7–3.1)
LYMPHOCYTES NFR BLD AUTO: 32 %
MCH RBC QN AUTO: 31 PG (ref 26.6–33)
MCHC RBC AUTO-ENTMCNC: 33.8 G/DL (ref 31.5–35.7)
MCV RBC AUTO: 92 FL (ref 79–97)
MONOCYTES # BLD AUTO: 0.6 X10E3/UL (ref 0.1–0.9)
MONOCYTES NFR BLD AUTO: 6 %
NEUTROPHILS # BLD AUTO: 6 X10E3/UL (ref 1.4–7)
NEUTROPHILS NFR BLD AUTO: 58 %
PLATELET # BLD AUTO: 424 X10E3/UL (ref 150–450)
POTASSIUM SERPL-SCNC: 4.6 MMOL/L (ref 3.5–5.2)
RBC # BLD AUTO: 4.8 X10E6/UL (ref 4.14–5.8)
SODIUM SERPL-SCNC: 141 MMOL/L (ref 134–144)
TRIGL SERPL-MCNC: 139 MG/DL (ref 0–149)
VLDLC SERPL CALC-MCNC: 25 MG/DL (ref 5–40)
WBC # BLD AUTO: 10.3 X10E3/UL (ref 3.4–10.8)

## 2025-01-20 DIAGNOSIS — E78.41 ELEVATED LIPOPROTEIN(A): Primary | ICD-10-CM

## 2025-01-20 RX ORDER — ATORVASTATIN CALCIUM 20 MG/1
20 TABLET, FILM COATED ORAL DAILY
Qty: 90 TABLET | Refills: 3 | Status: SHIPPED | OUTPATIENT
Start: 2025-01-20

## 2025-01-20 NOTE — PROGRESS NOTES
Gelacio your hemoglobin A1c is 5.8 which is 0.1 points higher than 5 months ago.  Your LDL cholesterol is at 133 that has increased also I would like to send in a statin to start you on to lower your stroke risk.  I will send in atorvastatin 20 mg for you to take daily to help lower this level.  Your kidney function is fine at this time.

## 2025-07-17 NOTE — PROGRESS NOTES
Subjective   Gelacio Freeman is a 40 y.o. male. Presents today for   Chief Complaint   Patient presents with    Annual Exam       History Of Present Illness Gelacio vickers is a 40-year-old male presenting today for 6-month follow-up    Hyperlipidemia  Insomnia    History of Present Illness  The patient presents for a physical exam.    He reports an improvement in his stress levels following a change in his job. He is currently on atorvastatin and is seeking a refill of this medication.    He has been experiencing weight gain and is interested in exploring options to aid in weight loss. He recalls a previous weight loss of 23 pounds, which significantly boosted his energy levels. However, he now feels sluggish and lacks energy.    He also mentions a sensation of itchiness deep within his ears.    He reports no swelling in his feet or lower legs but notes some swelling in his knees. He does not experience any issues with bowel movements, such as constipation or diarrhea, and has no difficulty initiating or stopping urination.    MEDICATIONS  atorvastatin    There is no problem list on file for this patient.      Social History     Socioeconomic History    Marital status:    Tobacco Use    Smoking status: Every Day     Current packs/day: 0.50     Average packs/day: 0.5 packs/day for 25.5 years (12.8 ttl pk-yrs)     Types: Cigarettes     Start date: 2000    Smokeless tobacco: Never   Vaping Use    Vaping status: Every Day    Substances: Nicotine, THC    Devices: Disposable   Substance and Sexual Activity    Alcohol use: Not Currently    Drug use: No    Sexual activity: Defer       No Known Allergies    Current Outpatient Medications on File Prior to Visit   Medication Sig Dispense Refill    [DISCONTINUED] atorvastatin (LIPITOR) 20 MG tablet Take 1 tablet by mouth Daily. 90 tablet 3    [DISCONTINUED] desvenlafaxine (PRISTIQ) 50 MG 24 hr tablet Take 1 tablet by mouth Daily. (Patient not taking:  "Reported on 1/17/2025) 90 tablet 3    [DISCONTINUED] ibuprofen (ADVIL,MOTRIN) 800 MG tablet Take 800 mg by mouth Every 6 (Six) Hours As Needed. for pain (Patient not taking: Reported on 7/29/2024)      [DISCONTINUED] ramelteon (ROZEREM) 8 MG tablet Take 1 tablet by mouth Every Night. (Patient not taking: Reported on 7/18/2025) 30 tablet 0     No current facility-administered medications on file prior to visit.       Objective   Vitals:    07/18/25 1002   BP: 112/64   Pulse: 62   SpO2: 97%   Weight: 86 kg (189 lb 9.6 oz)   Height: 177.8 cm (70\")     Body mass index is 27.2 kg/m².    Physical Exam  Ears appear normal with no cerumen impaction. Mild fluid is present behind the tympanic membrane.  Lungs are clear.  Heart sounds are normal.  Physical Exam  HENT:      Head: Normocephalic and atraumatic.      Right Ear: Tympanic membrane, ear canal and external ear normal.      Left Ear: Tympanic membrane, ear canal and external ear normal.      Ears:      Comments: Small amount of fluid behind TM's.  Use flonase to clear fluid from ears.     Mouth/Throat:      Mouth: Mucous membranes are moist.   Cardiovascular:      Rate and Rhythm: Normal rate and regular rhythm.      Pulses: Normal pulses.      Heart sounds: Normal heart sounds.   Pulmonary:      Effort: Pulmonary effort is normal.      Breath sounds: Normal breath sounds.         Results         Procedures     Assessment & Plan   Diagnoses and all orders for this visit:    1. Depression, unspecified depression type (Primary)  -     buPROPion SR (Wellbutrin SR) 100 MG 12 hr tablet; Take 1 tablet by mouth 2 (Two) Times a Day.  Dispense: 180 tablet; Refill: 1  -     naltrexone (DEPADE) 50 MG tablet; Take 1 tablet by mouth Daily.  Dispense: 90 tablet; Refill: 1    2. Elevated lipoprotein(a)  -     atorvastatin (LIPITOR) 20 MG tablet; Take 1 tablet by mouth Daily.  Dispense: 90 tablet; Refill: 3         Assessment & Plan  1. Weight management.  He has been experiencing " weight gain and is interested in exploring options to aid in weight loss. Bupropion and naltrexone were discussed as potential options for weight loss. Bupropion is good for weight loss and naltrexone helps make you feel full longer.    2. Ear discomfort.  He reports itchiness deep in his ears. Examination revealed a little bit of fluid behind the eardrum, which could be causing the discomfort. Over-the-counter Flonase nasal spray was recommended to be used in the mornings to help clear out the eustachian tubes.    3. Medication management.  He is currently taking atorvastatin and needs a refill. A prescription for atorvastatin will be provided.    Follow-up  The patient will follow up in 6 months.                 Return in about 6 months (around 1/18/2026) for Next scheduled follow up.     Discussed Care Gaps, ordered referrals and encouraged vaccination updates.       - Pt agrees with plan of care and denies further questions/concerns today  - This document is intended for medical expert use only. Persons  reading this document without medical staff guidance may result in misinterpretation and unintended morbidity     Go to the ER for any possible life-threatening symptoms such as chest pain or shortness of air.      Please allow 3-5 business days for recommendations based on new results      I personally spent time with this patient, preparing for the visit, reviewing tests, obtaining and/or reviewing a separately obtained history, performing a medically appropriate examination and/or evaluation, counseling and educating the patient/family/caregiver, ordering medications,  documenting information in the medical record and indepentently interpreting results.         Patient or patient representative verbalized consent for the use of Ambient Listening during the visit with  POLLY Vergara for chart documentation. 7/18/2025  10:23 EDT

## 2025-07-18 ENCOUNTER — OFFICE VISIT (OUTPATIENT)
Dept: FAMILY MEDICINE CLINIC | Facility: CLINIC | Age: 40
End: 2025-07-18
Payer: COMMERCIAL

## 2025-07-18 VITALS
DIASTOLIC BLOOD PRESSURE: 64 MMHG | WEIGHT: 189.6 LBS | BODY MASS INDEX: 27.14 KG/M2 | HEART RATE: 62 BPM | OXYGEN SATURATION: 97 % | SYSTOLIC BLOOD PRESSURE: 112 MMHG | HEIGHT: 70 IN

## 2025-07-18 DIAGNOSIS — F32.A DEPRESSION, UNSPECIFIED DEPRESSION TYPE: Primary | ICD-10-CM

## 2025-07-18 DIAGNOSIS — E78.41 ELEVATED LIPOPROTEIN(A): ICD-10-CM

## 2025-07-18 PROCEDURE — 99213 OFFICE O/P EST LOW 20 MIN: CPT | Performed by: NURSE PRACTITIONER

## 2025-07-18 RX ORDER — ATORVASTATIN CALCIUM 20 MG/1
20 TABLET, FILM COATED ORAL DAILY
Qty: 90 TABLET | Refills: 3 | Status: SHIPPED | OUTPATIENT
Start: 2025-07-18

## 2025-07-18 RX ORDER — NALTREXONE HYDROCHLORIDE 50 MG/1
50 TABLET, FILM COATED ORAL DAILY
Qty: 90 TABLET | Refills: 1 | Status: SHIPPED | OUTPATIENT
Start: 2025-07-18

## 2025-07-18 RX ORDER — BUPROPION HYDROCHLORIDE 100 MG/1
100 TABLET, EXTENDED RELEASE ORAL 2 TIMES DAILY
Qty: 180 TABLET | Refills: 1 | Status: SHIPPED | OUTPATIENT
Start: 2025-07-18